# Patient Record
Sex: MALE | Race: WHITE | NOT HISPANIC OR LATINO | Employment: FULL TIME | URBAN - NONMETROPOLITAN AREA
[De-identification: names, ages, dates, MRNs, and addresses within clinical notes are randomized per-mention and may not be internally consistent; named-entity substitution may affect disease eponyms.]

---

## 2018-02-12 ENCOUNTER — TELEPHONE (OUTPATIENT)
Dept: ENDOCRINOLOGY | Facility: CLINIC | Age: 53
End: 2018-02-12

## 2018-06-06 ENCOUNTER — OFFICE VISIT (OUTPATIENT)
Dept: ENDOCRINOLOGY | Facility: CLINIC | Age: 53
End: 2018-06-06

## 2018-06-06 ENCOUNTER — APPOINTMENT (OUTPATIENT)
Dept: LAB | Facility: HOSPITAL | Age: 53
End: 2018-06-06

## 2018-06-06 VITALS
WEIGHT: 240 LBS | DIASTOLIC BLOOD PRESSURE: 82 MMHG | HEART RATE: 84 BPM | BODY MASS INDEX: 29.84 KG/M2 | HEIGHT: 75 IN | SYSTOLIC BLOOD PRESSURE: 140 MMHG

## 2018-06-06 DIAGNOSIS — R94.6 ABNORMAL THYROID FUNCTION TEST: ICD-10-CM

## 2018-06-06 DIAGNOSIS — E04.1 SOLITARY THYROID NODULE: Primary | ICD-10-CM

## 2018-06-06 LAB
T3 SERPL-MCNC: 119 NG/DL (ref 97–169)
T4 FREE SERPL-MCNC: 0.78 NG/DL (ref 0.78–2.19)
TSH SERPL DL<=0.05 MIU/L-ACNC: 2.38 MIU/ML (ref 0.46–4.68)

## 2018-06-06 PROCEDURE — 84445 ASSAY OF TSI GLOBULIN: CPT | Performed by: NURSE PRACTITIONER

## 2018-06-06 PROCEDURE — 84443 ASSAY THYROID STIM HORMONE: CPT | Performed by: NURSE PRACTITIONER

## 2018-06-06 PROCEDURE — 99204 OFFICE O/P NEW MOD 45 MIN: CPT | Performed by: NURSE PRACTITIONER

## 2018-06-06 PROCEDURE — 83520 IMMUNOASSAY QUANT NOS NONAB: CPT | Performed by: NURSE PRACTITIONER

## 2018-06-06 PROCEDURE — 84480 ASSAY TRIIODOTHYRONINE (T3): CPT | Performed by: NURSE PRACTITIONER

## 2018-06-06 PROCEDURE — 86376 MICROSOMAL ANTIBODY EACH: CPT | Performed by: NURSE PRACTITIONER

## 2018-06-06 PROCEDURE — 84439 ASSAY OF FREE THYROXINE: CPT | Performed by: NURSE PRACTITIONER

## 2018-06-06 PROCEDURE — 36415 COLL VENOUS BLD VENIPUNCTURE: CPT | Performed by: NURSE PRACTITIONER

## 2018-06-06 RX ORDER — LISINOPRIL 20 MG/1
TABLET ORAL
COMMUNITY
Start: 2018-04-05

## 2018-06-06 RX ORDER — HYDROCODONE BITARTRATE AND ACETAMINOPHEN 10; 325 MG/1; MG/1
TABLET ORAL
COMMUNITY
Start: 2018-05-07

## 2018-06-06 RX ORDER — PRAVASTATIN SODIUM 40 MG
TABLET ORAL
COMMUNITY
Start: 2018-05-28

## 2018-06-06 RX ORDER — CETIRIZINE HYDROCHLORIDE 10 MG/1
10 TABLET ORAL DAILY
COMMUNITY

## 2018-06-06 RX ORDER — DILTIAZEM HCL 90 MG
TABLET ORAL
COMMUNITY

## 2018-06-06 RX ORDER — ALPRAZOLAM 2 MG/1
TABLET ORAL
COMMUNITY

## 2018-06-06 RX ORDER — ERGOCALCIFEROL 1.25 MG/1
CAPSULE ORAL
COMMUNITY
Start: 2018-04-05

## 2018-06-06 RX ORDER — FLUTICASONE PROPIONATE 50 MCG
SPRAY, SUSPENSION (ML) NASAL
COMMUNITY

## 2018-06-06 RX ORDER — FLUOXETINE HYDROCHLORIDE 20 MG/1
CAPSULE ORAL
COMMUNITY
Start: 2018-05-17

## 2018-06-06 NOTE — PROGRESS NOTES
Referring provider Nuzhat AGOSTO     History     52 year old male presents for consultation    REASON - thyroid nodule       Duration  - March 2018     Timing - constant    Quality - not controlled    Severity - moderate     Context - ultrasound ordered due to abnormal thyroid function studies    Location/size -     Thyroid ultrasound dated March 2018    Right lobe 4.8 x 1.5 x 1.4 cm     Left lobe 4.8 x 2.4 x 2.2 cm     Well defined isoechoic mass in the mid - inferior aspect of the left lobe measures 2.2 cm in the greatest dimension no other nodules identified     Quantity -     Jan. 2018     TSH - 5.47     Symptoms - fatigue, constipation    Alleviating Factors - none    Aggravating Factors - none    Past Medical History:   Diagnosis Date   • Anxiety    • Hyperlipidemia    • Hypertension      Past Surgical History:   Procedure Laterality Date   • ELBOW ARTHROPLASTY Right    • SHOULDER SURGERY       Family History   Problem Relation Age of Onset   • Breast cancer Mother    • Thyroid cancer Sister      Social History   Substance Use Topics   • Smoking status: Former Smoker   • Smokeless tobacco: Current User   • Alcohol use Yes      Comment: socially           Current Outpatient Prescriptions:   •  cetirizine (zyrTEC) 10 MG tablet, Take 10 mg by mouth Daily., Disp: , Rfl:   •  ALPRAZolam (XANAX) 2 MG tablet, Xanax 2 mg tablet  Take 1 tablet as needed by oral route., Disp: , Rfl:   •  diltiaZEM (CARDIZEM) 90 MG tablet, diltiazem 90 mg tablet  Take 1 tab PO BID, Disp: , Rfl:   •  FLUoxetine (PROzac) 20 MG capsule, , Disp: , Rfl:   •  fluticasone (FLONASE) 50 MCG/ACT nasal spray, fluticasone 50 mcg/actuation nasal spray,suspension  Inhale 1 spray every day by intranasal route for 30 days., Disp: , Rfl:   •  HYDROcodone-acetaminophen (NORCO)  MG per tablet, , Disp: , Rfl:   •  lisinopril (PRINIVIL,ZESTRIL) 20 MG tablet, , Disp: , Rfl:   •  pravastatin (PRAVACHOL) 40 MG tablet, , Disp: , Rfl:   •  vitamin D  "(ERGOCALCIFEROL) 12960 units capsule capsule, , Disp: , Rfl:         Review of Systems   Constitutional: Negative for activity change, appetite change, diaphoresis and fatigue.   HENT: Negative for facial swelling, sneezing, sore throat, tinnitus, trouble swallowing and voice change.    Eyes: Negative for photophobia, pain, discharge, redness, itching and visual disturbance.   Respiratory: Negative for apnea, cough, choking, chest tightness and shortness of breath.    Cardiovascular: Negative for chest pain, palpitations and leg swelling.   Gastrointestinal: Negative for abdominal distention, abdominal pain, constipation, diarrhea, nausea and vomiting.   Endocrine: Negative for cold intolerance, heat intolerance, polydipsia, polyphagia and polyuria.   Genitourinary: Negative for difficulty urinating, dysuria, frequency, hematuria and urgency.   Musculoskeletal: Negative for arthralgias, back pain, gait problem, joint swelling, myalgias, neck pain and neck stiffness.   Skin: Negative for color change, pallor, rash and wound.   Neurological: Negative for dizziness, tremors, weakness, light-headedness, numbness and headaches.   Hematological: Negative for adenopathy. Does not bruise/bleed easily.   Psychiatric/Behavioral: Negative for behavioral problems, confusion and sleep disturbance.       /82 (BP Location: Left arm, Patient Position: Sitting, Cuff Size: Adult)   Pulse 84   Ht 190.5 cm (75\")   Wt 109 kg (240 lb)   BMI 30.00 kg/m²     Physical Exam   Constitutional: He is oriented to person, place, and time. He appears well-developed and well-nourished. No distress.   HENT:   Head: Normocephalic and atraumatic.   Right Ear: External ear normal.   Left Ear: External ear normal.   Nose: Nose normal.   Eyes: Conjunctivae and EOM are normal. Pupils are equal, round, and reactive to light.   Neck: Normal range of motion. Neck supple. No tracheal deviation present. No thyromegaly present.   Cardiovascular: " Normal rate, regular rhythm and normal heart sounds.    No murmur heard.  Pulmonary/Chest: Effort normal and breath sounds normal. No respiratory distress. He has no wheezes.   Abdominal: Soft. Bowel sounds are normal. There is no tenderness. There is no rebound and no guarding.   Musculoskeletal: Normal range of motion. He exhibits no edema, tenderness or deformity.   Neurological: He is alert and oriented to person, place, and time. No cranial nerve deficit.   Skin: Skin is warm and dry. No rash noted.   Psychiatric: He has a normal mood and affect. His behavior is normal. Judgment and thought content normal.           Labs    No results found for: GLUCOSE, BUN, CREATININE, EGFRIFNONA, EGFRIFAFRI, BCR, K, CO2, CALCIUM, PROTENTOTREF, ALBUMIN, LABIL2, AST, ALT    No results found for: WBC, HGB, HCT, MCV, PLT    No results found for: [id number]    No results found for: HGBA1C          No results found for: TSH    No results found for: FREET4    No results found for: [id number]    Thyroid Peroxidase Antibodies    No results found for: THYROIDAB     ThyroidStimulating Immunoglobulin    No results found for: LABTHYR      Assessment         ICD-10-CM ICD-9-CM   1. Solitary thyroid nodule E04.1 241.0   2. Abnormal thyroid function test R94.6 794.5       Left thyroid nodule  Abnormal thyroid function studies    In summary  is a 52 year old male found to have an abnormal thyroid function studies and a thyroid nodule. He has been referred to us for further evaluation.     PLAN:    Thyroid nodule       Thyroid imaging report reviewed from March 2018     Left mid-inferior thyroid nodule isoechoic and measures 2.2 cm     It is greater than 1.5 cm     Based on current [place] guidelines will schedule for FNA biopsy with     Scheduled for June 20, 2018 at 1:30    No ASA one week prior to next appointment        Abnormal thyroid function studies    Repeat thyroid levels today and include TPO ab    Will call patient with  lab results as soon as available      Follow up depends on labs results and biopsy results    Plan on a 6 month follow up if labs are normal        Records from Mrs. Salazar received and reviewed  Thank you for this consultation              Records from 2018 obtained and summarized  Labs from Jan. 2018 reviewed and ordered today  Thyroid imaging report reviewed from March 2018 and imaging ordered

## 2018-06-07 ENCOUNTER — TELEPHONE (OUTPATIENT)
Dept: ENDOCRINOLOGY | Facility: CLINIC | Age: 53
End: 2018-06-07

## 2018-06-07 NOTE — TELEPHONE ENCOUNTER
----- Message from TRINY Garzon sent at 6/7/2018  7:49 AM CDT -----  Thyroid levels are normal ; no medication needed at this time

## 2018-06-08 ENCOUNTER — TELEPHONE (OUTPATIENT)
Dept: ENDOCRINOLOGY | Facility: CLINIC | Age: 53
End: 2018-06-08

## 2018-06-08 LAB
THYROPEROXIDASE AB SERPL-ACNC: 10 IU/ML (ref 0–34)
TSH RECEP AB SER-ACNC: <0.5 IU/L (ref 0–1.75)

## 2018-06-08 NOTE — TELEPHONE ENCOUNTER
----- Message from TRINY Garzon sent at 6/8/2018  8:37 AM CDT -----  Please let him know the test for Hashimoto's was negative

## 2018-06-09 LAB — TSI SER-MCNC: <0.1 IU/L (ref 0–0.55)

## 2018-06-20 ENCOUNTER — HOSPITAL ENCOUNTER (OUTPATIENT)
Dept: ULTRASOUND IMAGING | Facility: HOSPITAL | Age: 53
Discharge: HOME | End: 2018-06-20
Admitting: INTERNAL MEDICINE

## 2018-06-20 DIAGNOSIS — E04.1 SOLITARY THYROID NODULE: ICD-10-CM

## 2018-06-20 PROCEDURE — 88173 CYTOPATH EVAL FNA REPORT: CPT | Performed by: NURSE PRACTITIONER

## 2018-06-20 PROCEDURE — 76942 ECHO GUIDE FOR BIOPSY: CPT

## 2018-06-20 PROCEDURE — 88173 CYTOPATH EVAL FNA REPORT: CPT | Performed by: PATHOLOGY

## 2018-06-20 PROCEDURE — 10022 US GUIDED FINE NEEDLE ASPIRATION: CPT | Performed by: INTERNAL MEDICINE

## 2018-06-20 PROCEDURE — 76942 ECHO GUIDE FOR BIOPSY: CPT | Performed by: INTERNAL MEDICINE

## 2018-06-22 LAB
LAB AP CASE REPORT: NORMAL
LAB AP DIAGNOSIS COMMENT: NORMAL
LAB AP NON-GYN SPECIMEN ADEQUACY: NORMAL
PATH REPORT.FINAL DX SPEC: NORMAL

## 2018-06-29 ENCOUNTER — TELEPHONE (OUTPATIENT)
Dept: FAMILY MEDICINE CLINIC | Facility: CLINIC | Age: 53
End: 2018-06-29

## 2018-07-02 ENCOUNTER — TELEPHONE (OUTPATIENT)
Dept: ENDOCRINOLOGY | Facility: CLINIC | Age: 53
End: 2018-07-02

## 2018-07-02 NOTE — TELEPHONE ENCOUNTER
Negative for malignant cells it was benign needs a repeat ultrasound in 6 months with an appt one week later         Documentation       You   TRINY Garzon 3 days ago         Waiting on biopsy results he had done last week         Documentation       Ashley Lugo MA   You 3 days ago         JUSTIN MENG HAD NEEDLE BIOPSY DONE LAST WK AND IS STILL WAITING ON RESULTS....PLEASE CALL HIM BACK

## 2025-04-04 ENCOUNTER — TELEPHONE (OUTPATIENT)
Dept: SURGERY | Facility: CLINIC | Age: 60
End: 2025-04-04
Payer: COMMERCIAL

## 2025-04-04 NOTE — TELEPHONE ENCOUNTER
Offered pt sooner appointment, pt couldn't be scheduled due to work. Scheduled pt later in the week.